# Patient Record
Sex: FEMALE | Race: WHITE | URBAN - NONMETROPOLITAN AREA
[De-identification: names, ages, dates, MRNs, and addresses within clinical notes are randomized per-mention and may not be internally consistent; named-entity substitution may affect disease eponyms.]

---

## 2018-01-11 ENCOUNTER — FOLLOW UP ESTABLISHED (OUTPATIENT)
Dept: URBAN - NONMETROPOLITAN AREA CLINIC 13 | Facility: CLINIC | Age: 78
End: 2018-01-11
Payer: MEDICARE

## 2018-01-11 PROCEDURE — 92014 COMPRE OPH EXAM EST PT 1/>: CPT | Performed by: OPHTHALMOLOGY

## 2018-01-11 PROCEDURE — 92133 CPTRZD OPH DX IMG PST SGM ON: CPT | Performed by: OPHTHALMOLOGY

## 2018-01-11 ASSESSMENT — INTRAOCULAR PRESSURE
OD: 12
OS: 11

## 2018-01-11 ASSESSMENT — VISUAL ACUITY
OD: 20/20
OS: 20/20

## 2019-03-29 ENCOUNTER — FOLLOW UP ESTABLISHED (OUTPATIENT)
Dept: URBAN - NONMETROPOLITAN AREA CLINIC 13 | Facility: CLINIC | Age: 79
End: 2019-03-29
Payer: MEDICARE

## 2019-03-29 DIAGNOSIS — H52.221 REGULAR ASTIGMATISM, RIGHT EYE: ICD-10-CM

## 2019-03-29 PROCEDURE — 92133 CPTRZD OPH DX IMG PST SGM ON: CPT | Performed by: OPTOMETRIST

## 2019-03-29 PROCEDURE — 92014 COMPRE OPH EXAM EST PT 1/>: CPT | Performed by: OPTOMETRIST

## 2019-03-29 ASSESSMENT — INTRAOCULAR PRESSURE
OD: 13
OS: 13

## 2019-03-29 ASSESSMENT — VISUAL ACUITY
OS: 20/20
OD: 20/20

## 2021-08-13 ENCOUNTER — OFFICE VISIT (OUTPATIENT)
Dept: URBAN - NONMETROPOLITAN AREA CLINIC 13 | Facility: CLINIC | Age: 81
End: 2021-08-13
Payer: MEDICARE

## 2021-08-13 DIAGNOSIS — H25.11 AGE-RELATED NUCLEAR CATARACT, RIGHT EYE: Primary | ICD-10-CM

## 2021-08-13 DIAGNOSIS — H40.013 OPEN ANGLE WITH BORDERLINE FINDINGS, LOW RISK, BILATERAL: ICD-10-CM

## 2021-08-13 DIAGNOSIS — Z96.1 PRESENCE OF INTRAOCULAR LENS: ICD-10-CM

## 2021-08-13 PROCEDURE — 92133 CPTRZD OPH DX IMG PST SGM ON: CPT | Performed by: OPTOMETRIST

## 2021-08-13 PROCEDURE — 92014 COMPRE OPH EXAM EST PT 1/>: CPT | Performed by: OPTOMETRIST

## 2021-08-13 ASSESSMENT — INTRAOCULAR PRESSURE
OD: 18
OS: 14

## 2021-08-13 NOTE — IMPRESSION/PLAN
Impression: Age-related nuclear cataract, right eye: H25.11. Plan: Cataracts account for the patient's complaints. Discussed options, surgery or spectacle change. Explained surgery risks, benefits, procedures and recovery. Patient defers surgery and elects to continue w/ glasses for now. If there is no improvement, ok to schedule surgery.

## 2021-08-13 NOTE — IMPRESSION/PLAN
Impression: Open angle with borderline findings, low risk, bilateral: H40.013. Plan: monitor, IOP still in normal range and oct rnfl is wnl OU, needs annual monitor.

## 2022-04-20 ENCOUNTER — OFFICE VISIT (OUTPATIENT)
Dept: URBAN - NONMETROPOLITAN AREA CLINIC 14 | Facility: CLINIC | Age: 82
End: 2022-04-20
Payer: MEDICARE

## 2022-04-20 DIAGNOSIS — H16.223 KERATOCONJUNCTIVITIS SICCA, BILATERAL: ICD-10-CM

## 2022-04-20 DIAGNOSIS — H25.11 AGE-RELATED NUCLEAR CATARACT, RIGHT EYE: Primary | ICD-10-CM

## 2022-04-20 DIAGNOSIS — R51.9 HEADACHE: ICD-10-CM

## 2022-04-20 DIAGNOSIS — H25.811 COMBINED FORMS OF AGE-RELATED CATARACT, RIGHT EYE: ICD-10-CM

## 2022-04-20 PROCEDURE — 99214 OFFICE O/P EST MOD 30 MIN: CPT | Performed by: OPTOMETRIST

## 2022-04-20 PROCEDURE — 92134 CPTRZ OPH DX IMG PST SGM RTA: CPT | Performed by: OPTOMETRIST

## 2022-04-20 RX ORDER — NEOMYCIN SULFATE, POLYMYXIN B SULFATE AND DEXAMETHASONE 3.5; 10000; 1 MG/ML; [USP'U]/ML; MG/ML
SUSPENSION/ DROPS OPHTHALMIC
Qty: 5 | Refills: 0 | Status: ACTIVE
Start: 2022-04-20

## 2022-04-20 ASSESSMENT — INTRAOCULAR PRESSURE
OS: 13
OD: 17

## 2022-04-20 ASSESSMENT — VISUAL ACUITY: OS: 20/20

## 2022-04-20 NOTE — IMPRESSION/PLAN
Impression: Combined forms of age-related cataract, right eye: H25.811. Plan: Refer for CE OD. Educated pt. on risks/benefits of CE. Pt. elects to proceed with CE at this time.

## 2022-04-20 NOTE — IMPRESSION/PLAN
Impression: Keratoconjunctivitis sicca, bilateral: K88.672. Plan: Start maxitrol QID OD. RTC 1 week for F/U.

## 2022-05-02 ENCOUNTER — OFFICE VISIT (OUTPATIENT)
Dept: URBAN - NONMETROPOLITAN AREA CLINIC 14 | Facility: CLINIC | Age: 82
End: 2022-05-02
Payer: MEDICARE

## 2022-05-02 DIAGNOSIS — R51.9 HEADACHE: ICD-10-CM

## 2022-05-02 DIAGNOSIS — H16.223 KERATOCONJUNCTIVITIS SICCA, BILATERAL: Primary | ICD-10-CM

## 2022-05-02 PROCEDURE — 99213 OFFICE O/P EST LOW 20 MIN: CPT | Performed by: OPTOMETRIST

## 2022-05-02 ASSESSMENT — INTRAOCULAR PRESSURE
OD: 18
OS: 14

## 2022-05-02 NOTE — IMPRESSION/PLAN
Impression: Keratoconjunctivitis sicca, bilateral: I99.092. Plan: D/C maxitrol. Start ATs QID OU. RTC PRN with increased symptoms.

## 2022-05-20 ENCOUNTER — PRE-OPERATIVE VISIT (OUTPATIENT)
Dept: URBAN - NONMETROPOLITAN AREA CLINIC 13 | Facility: CLINIC | Age: 82
End: 2022-05-20
Payer: MEDICARE

## 2022-05-20 PROCEDURE — 99204 OFFICE O/P NEW MOD 45 MIN: CPT | Performed by: OPHTHALMOLOGY

## 2022-05-20 ASSESSMENT — INTRAOCULAR PRESSURE
OS: 12
OD: 18

## 2022-05-20 ASSESSMENT — VISUAL ACUITY
OD: 20/25
OS: 20/20

## 2022-05-20 NOTE — IMPRESSION/PLAN
Impression: Combined forms of age-related cataract, right eye: H25.811. Plan: Discussed diagnosis in detail with patient. Discussed treatment options with patient. Patient elects to have surgery. Surgical risks and benefits were discussed, explained and understood by patient.  OD std iol aim plano OD  ok dexycu order ascan/optical biometry for iol calculations

## 2022-05-20 NOTE — IMPRESSION/PLAN
Impression: Keratoconjunctivitis sicca, bilateral: P12.476.  Plan:  observe for now ok to use artifical tears as needed

## 2022-05-24 ENCOUNTER — TESTING ONLY (OUTPATIENT)
Dept: URBAN - NONMETROPOLITAN AREA CLINIC 13 | Facility: CLINIC | Age: 82
End: 2022-05-24
Payer: MEDICARE

## 2022-05-24 DIAGNOSIS — H25.811 COMBINED FORMS OF AGE-RELATED CATARACT, RIGHT EYE: Primary | ICD-10-CM

## 2022-05-24 DIAGNOSIS — H25.11 AGE-RELATED NUCLEAR CATARACT, RIGHT EYE: ICD-10-CM

## 2022-05-24 DIAGNOSIS — Z01.818 ENCOUNTER FOR OTHER PREPROCEDURAL EXAMINATION: Primary | ICD-10-CM

## 2022-05-24 PROCEDURE — 99203 OFFICE O/P NEW LOW 30 MIN: CPT | Performed by: PHYSICIAN ASSISTANT

## 2022-06-06 ENCOUNTER — SURGERY (OUTPATIENT)
Dept: URBAN - NONMETROPOLITAN AREA SURGERY 4 | Facility: SURGERY | Age: 82
End: 2022-06-06
Payer: MEDICARE

## 2022-06-06 DIAGNOSIS — H25.811 COMBINED FORMS OF AGE-RELATED CATARACT, RIGHT EYE: Primary | ICD-10-CM

## 2022-06-06 PROCEDURE — 66984 XCAPSL CTRC RMVL W/O ECP: CPT | Performed by: OPHTHALMOLOGY

## 2022-06-07 ENCOUNTER — POST-OPERATIVE VISIT (OUTPATIENT)
Dept: URBAN - NONMETROPOLITAN AREA CLINIC 14 | Facility: CLINIC | Age: 82
End: 2022-06-07
Payer: MEDICARE

## 2022-06-07 DIAGNOSIS — Z48.810 ENCOUNTER FOR SURGICAL AFTERCARE FOLLOWING SURGERY ON A SENSE ORGAN: Primary | ICD-10-CM

## 2022-06-07 PROCEDURE — 99024 POSTOP FOLLOW-UP VISIT: CPT | Performed by: OPTOMETRIST

## 2022-06-07 ASSESSMENT — INTRAOCULAR PRESSURE
OS: 27
OD: 17

## 2022-06-07 NOTE — IMPRESSION/PLAN
Impression: S/P Cataract Extraction by phacoemulsification with IOL placement OD - 1 Day. Encounter for surgical aftercare following surgery on a sense organ  Z48.810. Plan: Pt doing well. RTC 1 week for F/U or sooner with changes.

## 2022-06-14 ENCOUNTER — POST-OPERATIVE VISIT (OUTPATIENT)
Dept: URBAN - NONMETROPOLITAN AREA CLINIC 14 | Facility: CLINIC | Age: 82
End: 2022-06-14
Payer: MEDICARE

## 2022-06-14 DIAGNOSIS — Z48.810 ENCOUNTER FOR SURGICAL AFTERCARE FOLLOWING SURGERY ON A SENSE ORGAN: Primary | ICD-10-CM

## 2022-06-14 PROCEDURE — 99024 POSTOP FOLLOW-UP VISIT: CPT | Performed by: OPTOMETRIST

## 2022-06-14 ASSESSMENT — INTRAOCULAR PRESSURE
OS: 12
OD: 11

## 2022-06-14 NOTE — IMPRESSION/PLAN
Impression:  Encounter for surgical aftercare following surgery on a sense organ  Z48.810. Plan: Pt doing well. RTC 3 weeks for F/U or sooner with changes.

## 2022-07-06 ENCOUNTER — POST-OPERATIVE VISIT (OUTPATIENT)
Dept: URBAN - NONMETROPOLITAN AREA CLINIC 14 | Facility: CLINIC | Age: 82
End: 2022-07-06
Payer: MEDICARE

## 2022-07-06 DIAGNOSIS — Z48.810 ENCOUNTER FOR SURGICAL AFTERCARE FOLLOWING SURGERY ON A SENSE ORGAN: Primary | ICD-10-CM

## 2022-07-06 PROCEDURE — 99024 POSTOP FOLLOW-UP VISIT: CPT | Performed by: OPTOMETRIST

## 2022-07-06 ASSESSMENT — VISUAL ACUITY
OS: 20/20
OD: 20/20

## 2022-07-06 ASSESSMENT — INTRAOCULAR PRESSURE
OS: 11
OD: 10

## 2022-07-06 NOTE — IMPRESSION/PLAN
Impression: S/P Cataract Extraction by phacoemulsification with IOL placement OD - 30 Days. Encounter for surgical aftercare following surgery on a sense organ  Z48.810. Plan: Pt doing well. RTC 6 months for repeat RNFL and lens check.

## 2023-01-06 ENCOUNTER — OFFICE VISIT (OUTPATIENT)
Dept: URBAN - NONMETROPOLITAN AREA CLINIC 14 | Facility: CLINIC | Age: 83
End: 2023-01-06
Payer: MEDICARE

## 2023-01-06 DIAGNOSIS — H26.491 OTHER SECONDARY CATARACT, RIGHT EYE: ICD-10-CM

## 2023-01-06 DIAGNOSIS — H40.013 OPEN ANGLE WITH BORDERLINE FINDINGS, LOW RISK, BILATERAL: Primary | ICD-10-CM

## 2023-01-06 PROCEDURE — 99214 OFFICE O/P EST MOD 30 MIN: CPT | Performed by: OPTOMETRIST

## 2023-01-06 PROCEDURE — 92133 CPTRZD OPH DX IMG PST SGM ON: CPT | Performed by: OPTOMETRIST

## 2023-01-06 ASSESSMENT — INTRAOCULAR PRESSURE
OS: 11
OD: 13

## 2023-01-06 NOTE — IMPRESSION/PLAN
Impression: Open angle with borderline findings, low risk, bilateral: H40.013. Plan: Normal RNFL. Stable IOP. RTC 6 months for HVF 24-2, RNFL, and pachs. Educated pt on all findings and potential for vision loss.

## 2023-01-06 NOTE — IMPRESSION/PLAN
Impression: Other secondary cataract, right eye: H26.491. Plan: Monitor x 1 year. RTC with any changes to vision.

## 2023-07-11 ENCOUNTER — TESTING ONLY (OUTPATIENT)
Dept: URBAN - NONMETROPOLITAN AREA CLINIC 13 | Facility: CLINIC | Age: 83
End: 2023-07-11
Payer: MEDICARE

## 2023-07-11 PROCEDURE — 92083 EXTENDED VISUAL FIELD XM: CPT | Performed by: OPTOMETRIST

## 2023-07-14 ENCOUNTER — OFFICE VISIT (OUTPATIENT)
Dept: URBAN - NONMETROPOLITAN AREA CLINIC 14 | Facility: CLINIC | Age: 83
End: 2023-07-14
Payer: MEDICARE

## 2023-07-14 DIAGNOSIS — H26.491 OTHER SECONDARY CATARACT, RIGHT EYE: ICD-10-CM

## 2023-07-14 DIAGNOSIS — H40.013 OPEN ANGLE WITH BORDERLINE FINDINGS, LOW RISK, BILATERAL: Primary | ICD-10-CM

## 2023-07-14 PROCEDURE — 99213 OFFICE O/P EST LOW 20 MIN: CPT | Performed by: OPTOMETRIST

## 2023-07-14 PROCEDURE — 92133 CPTRZD OPH DX IMG PST SGM ON: CPT | Performed by: OPTOMETRIST

## 2023-07-14 ASSESSMENT — INTRAOCULAR PRESSURE
OS: 10
OD: 10

## 2023-07-14 NOTE — IMPRESSION/PLAN
Impression: Open angle with borderline findings, low risk, bilateral: H40.013. Plan: Normal RNFL. Stable IOP. Possible early defect OS. RTC 6 months for HVF 24-2, RNFL. Educated pt on all findings and potential for vision loss.
Impression: Other secondary cataract, right eye: H26.491. Plan: Monitor x 1 year. RTC with any changes to vision.
no

## 2024-04-26 ENCOUNTER — OFFICE VISIT (OUTPATIENT)
Dept: URBAN - NONMETROPOLITAN AREA CLINIC 14 | Facility: CLINIC | Age: 84
End: 2024-04-26
Payer: MEDICARE

## 2024-04-26 DIAGNOSIS — H40.013 OPEN ANGLE WITH BORDERLINE FINDINGS, LOW RISK, BILATERAL: Primary | ICD-10-CM

## 2024-04-26 PROCEDURE — 92133 CPTRZD OPH DX IMG PST SGM ON: CPT | Performed by: OPTOMETRIST

## 2024-04-26 PROCEDURE — 99213 OFFICE O/P EST LOW 20 MIN: CPT | Performed by: OPTOMETRIST

## 2024-04-26 PROCEDURE — 92083 EXTENDED VISUAL FIELD XM: CPT | Performed by: OPTOMETRIST

## 2024-04-26 ASSESSMENT — INTRAOCULAR PRESSURE
OS: 12
OD: 12

## 2024-08-28 ENCOUNTER — OFFICE VISIT (OUTPATIENT)
Dept: URBAN - NONMETROPOLITAN AREA CLINIC 14 | Facility: CLINIC | Age: 84
End: 2024-08-28
Payer: MEDICARE

## 2024-08-28 DIAGNOSIS — H40.013 OPEN ANGLE WITH BORDERLINE FINDINGS, LOW RISK, BILATERAL: Primary | ICD-10-CM

## 2024-08-28 PROCEDURE — 92133 CPTRZD OPH DX IMG PST SGM ON: CPT | Performed by: OPTOMETRIST

## 2024-08-28 PROCEDURE — 99213 OFFICE O/P EST LOW 20 MIN: CPT | Performed by: OPTOMETRIST

## 2024-08-28 ASSESSMENT — INTRAOCULAR PRESSURE
OS: 10
OD: 10